# Patient Record
Sex: MALE | Race: WHITE | NOT HISPANIC OR LATINO | Employment: FULL TIME | URBAN - METROPOLITAN AREA
[De-identification: names, ages, dates, MRNs, and addresses within clinical notes are randomized per-mention and may not be internally consistent; named-entity substitution may affect disease eponyms.]

---

## 2021-03-25 ENCOUNTER — TELEPHONE (OUTPATIENT)
Dept: MEDICAL GROUP | Age: 56
End: 2021-03-25

## 2021-03-25 NOTE — TELEPHONE ENCOUNTER
Phone Number Called: [phone] (home) [phone] (work)      Call outcome: Did not leave a detailed message. Requested patient to call back.    Message: unable to leave message. number is no longer in service.

## 2021-03-26 ENCOUNTER — OFFICE VISIT (OUTPATIENT)
Dept: MEDICAL GROUP | Age: 56
End: 2021-03-26
Payer: COMMERCIAL

## 2021-03-26 VITALS
SYSTOLIC BLOOD PRESSURE: 120 MMHG | HEART RATE: 77 BPM | TEMPERATURE: 96 F | DIASTOLIC BLOOD PRESSURE: 74 MMHG | BODY MASS INDEX: 25.21 KG/M2 | HEIGHT: 74 IN | OXYGEN SATURATION: 95 % | WEIGHT: 196.4 LBS

## 2021-03-26 DIAGNOSIS — Z11.59 NEED FOR HEPATITIS C SCREENING TEST: ICD-10-CM

## 2021-03-26 DIAGNOSIS — Z76.89 ESTABLISHING CARE WITH NEW DOCTOR, ENCOUNTER FOR: ICD-10-CM

## 2021-03-26 DIAGNOSIS — Z12.12 SCREENING FOR COLORECTAL CANCER: ICD-10-CM

## 2021-03-26 DIAGNOSIS — Z23 NEED FOR VACCINATION: ICD-10-CM

## 2021-03-26 DIAGNOSIS — Z00.00 WELL ADULT EXAM: ICD-10-CM

## 2021-03-26 DIAGNOSIS — Z12.11 SCREENING FOR COLORECTAL CANCER: ICD-10-CM

## 2021-03-26 DIAGNOSIS — Z12.5 SCREENING FOR PROSTATE CANCER: ICD-10-CM

## 2021-03-26 PROCEDURE — 90471 IMMUNIZATION ADMIN: CPT | Performed by: PHYSICIAN ASSISTANT

## 2021-03-26 PROCEDURE — 90472 IMMUNIZATION ADMIN EACH ADD: CPT | Performed by: PHYSICIAN ASSISTANT

## 2021-03-26 PROCEDURE — 90715 TDAP VACCINE 7 YRS/> IM: CPT | Performed by: PHYSICIAN ASSISTANT

## 2021-03-26 PROCEDURE — 90750 HZV VACC RECOMBINANT IM: CPT | Performed by: PHYSICIAN ASSISTANT

## 2021-03-26 PROCEDURE — 99386 PREV VISIT NEW AGE 40-64: CPT | Mod: 25 | Performed by: PHYSICIAN ASSISTANT

## 2021-03-26 ASSESSMENT — PATIENT HEALTH QUESTIONNAIRE - PHQ9: CLINICAL INTERPRETATION OF PHQ2 SCORE: 0

## 2021-03-26 NOTE — PROGRESS NOTES
Subjective:     CC:   Chief Complaint   Patient presents with   • Establish Care   • Annual Exam       HPI:   Yasir Garcia Jr. is a 55 y.o. male who presents to [facility] and is requesting annual exam.  It has been over 10 years since he has been seen by primary care provider.  He works as a .  He has no significant past medical history.  He is overdue for colonoscopy.    Last Colorectal Cancer Screening: Due  Last Tdap: Due    Exercise: Stays active at work.  Walks/hikes in the summertime.  No specific exercise routine  Diet: Fairly well-rounded.    He  has no past medical history on file.  He  has no past surgical history on file.    Family History   Problem Relation Age of Onset   • No Known Problems Mother    • No Known Problems Father    • No Known Problems Brother      Social History     Tobacco Use   • Smoking status: Never Smoker   • Smokeless tobacco: Never Used   Substance Use Topics   • Alcohol use: Yes     Comment: once every 3 months   • Drug use: Never     He  has no history on file for sexual activity.    There are no problems to display for this patient.    No current outpatient medications on file.     No current facility-administered medications for this visit.     No Known Allergies    Review of Systems   Constitutional: Negative for fever, chills and malaise/fatigue.   HENT: Negative for congestion.    Eyes: Negative for pain.   Respiratory: Negative for cough and shortness of breath.    Cardiovascular: Negative for chest pain and leg swelling.   Gastrointestinal: Negative for nausea, vomiting, abdominal pain and diarrhea.   Genitourinary: Negative for dysuria and hematuria.   Skin: Negative for rash.   Neurological: Negative for dizziness, focal weakness and headaches.   Endo/Heme/Allergies: Does not bruise/bleed easily.   Psychiatric/Behavioral: Negative for depression.  The patient is not nervous/anxious.      Objective:   /74 (BP Location: Left arm,  "Patient Position: Sitting, BP Cuff Size: Adult)   Pulse 77   Temp (!) 35.6 °C (96 °F) (Temporal)   Ht 1.867 m (6' 1.5\")   Wt 89.1 kg (196 lb 6.4 oz)   SpO2 95%   BMI 25.56 kg/m²      Wt Readings from Last 4 Encounters:   03/26/21 89.1 kg (196 lb 6.4 oz)         Physical Exam:  Constitutional: Well-developed and well-nourished. Not diaphoretic. No distress.   Skin: Skin is warm and dry. No rash noted.  Head: Atraumatic without lesions.  Eyes: Conjunctivae and extraocular motions are normal. Pupils are equal, round, and reactive to light. No scleral icterus.   Ears:  External ears unremarkable. Tympanic membranes clear and intact.  Nose: Nares patent. Septum midline. Turbinates without erythema nor edema. No discharge.   Mouth/Throat: Tongue normal. Oropharynx is clear and moist. Posterior pharynx without erythema or exudates.  Neck: Supple, trachea midline. Normal range of motion. No thyromegaly present. No lymphadenopathy--cervical or supraclavicular.  Cardiovascular: Regular rate and rhythm, S1 and S2 without murmur, rubs, or gallops.    Respiratory: Effort normal. Clear to auscultation throughout. No adventitious sounds.   Abdomen: Soft, non tender, and without distention. Active bowel sounds in all four quadrants. No rebound, guarding, masses or HSM.  Extremities: No cyanosis, clubbing, erythema, nor edema. Distal pulses intact and symmetric.   Musculoskeletal: All major joints AROM full in all directions without pain.  Neurological: Alert and oriented x 3. Grossly non-focal. Strength and sensation grossly intact. DTRs 2+/3 and symmetric.   Psychiatric:  Behavior, mood, and affect are appropriate.      Assessment and Plan:     1. Well adult exam  -Advised to increase physical activity as tolerated.  Will check below labs.  Further treatment if needed pending results  - CBC WITH DIFFERENTIAL; Future  - Comp Metabolic Panel; Future  - Lipid Profile; Future  - TSH WITH REFLEX TO FT4; Future    2. Need for " vaccination  -Immunizations given in clinic today  - Tdap Vaccine =>8YO IM  - Shingrix Vaccine    3. Screening for prostate cancer  - PROSTATE SPECIFIC AG SCREENING; Future    4. Establishing care with new doctor, encounter for    5. Need for hepatitis C screening test  - HCV Scrn ( [id number] 1xLife); Future    6. Screening for colorectal cancer  -Due for colonoscopy.  Referral placed  - REFERRAL TO GI FOR COLONOSCOPY      Health maintenance:     Labs per orders  Immunizations per orders  Patient counseled about skin care, diet, supplements, and exercise.  Discussed diet and exercise and prostate cancer screening     Follow-up: Return in about 1 year (around 3/26/2022) for Annual PX.

## 2021-04-30 ENCOUNTER — HOSPITAL ENCOUNTER (OUTPATIENT)
Dept: LAB | Facility: MEDICAL CENTER | Age: 56
End: 2021-04-30
Attending: PHYSICIAN ASSISTANT
Payer: COMMERCIAL

## 2021-04-30 DIAGNOSIS — Z00.00 WELL ADULT EXAM: ICD-10-CM

## 2021-04-30 DIAGNOSIS — Z12.5 SCREENING FOR PROSTATE CANCER: ICD-10-CM

## 2021-04-30 DIAGNOSIS — Z11.59 NEED FOR HEPATITIS C SCREENING TEST: ICD-10-CM

## 2021-04-30 LAB
ALBUMIN SERPL BCP-MCNC: 4.2 G/DL (ref 3.2–4.9)
ALBUMIN/GLOB SERPL: 1.2 G/DL
ALP SERPL-CCNC: 71 U/L (ref 30–99)
ALT SERPL-CCNC: 15 U/L (ref 2–50)
ANION GAP SERPL CALC-SCNC: 6 MMOL/L (ref 7–16)
AST SERPL-CCNC: 18 U/L (ref 12–45)
BASOPHILS # BLD AUTO: 0.5 % (ref 0–1.8)
BASOPHILS # BLD: 0.03 K/UL (ref 0–0.12)
BILIRUB SERPL-MCNC: 1.8 MG/DL (ref 0.1–1.5)
BUN SERPL-MCNC: 19 MG/DL (ref 8–22)
CALCIUM SERPL-MCNC: 9.3 MG/DL (ref 8.5–10.5)
CHLORIDE SERPL-SCNC: 102 MMOL/L (ref 96–112)
CHOLEST SERPL-MCNC: 229 MG/DL (ref 100–199)
CO2 SERPL-SCNC: 27 MMOL/L (ref 20–33)
CREAT SERPL-MCNC: 1.06 MG/DL (ref 0.5–1.4)
EOSINOPHIL # BLD AUTO: 0.1 K/UL (ref 0–0.51)
EOSINOPHIL NFR BLD: 1.6 % (ref 0–6.9)
ERYTHROCYTE [DISTWIDTH] IN BLOOD BY AUTOMATED COUNT: 46.5 FL (ref 35.9–50)
FASTING STATUS PATIENT QL REPORTED: NORMAL
GLOBULIN SER CALC-MCNC: 3.6 G/DL (ref 1.9–3.5)
GLUCOSE SERPL-MCNC: 78 MG/DL (ref 65–99)
HCT VFR BLD AUTO: 49 % (ref 42–52)
HCV AB SER QL: NORMAL
HDLC SERPL-MCNC: 57 MG/DL
HGB BLD-MCNC: 17.2 G/DL (ref 14–18)
IMM GRANULOCYTES # BLD AUTO: 0.02 K/UL (ref 0–0.11)
IMM GRANULOCYTES NFR BLD AUTO: 0.3 % (ref 0–0.9)
LDLC SERPL CALC-MCNC: 146 MG/DL
LYMPHOCYTES # BLD AUTO: 1.58 K/UL (ref 1–4.8)
LYMPHOCYTES NFR BLD: 25.2 % (ref 22–41)
MCH RBC QN AUTO: 34.1 PG (ref 27–33)
MCHC RBC AUTO-ENTMCNC: 35.1 G/DL (ref 33.7–35.3)
MCV RBC AUTO: 97.2 FL (ref 81.4–97.8)
MONOCYTES # BLD AUTO: 0.53 K/UL (ref 0–0.85)
MONOCYTES NFR BLD AUTO: 8.5 % (ref 0–13.4)
NEUTROPHILS # BLD AUTO: 4 K/UL (ref 1.82–7.42)
NEUTROPHILS NFR BLD: 63.9 % (ref 44–72)
NRBC # BLD AUTO: 0 K/UL
NRBC BLD-RTO: 0 /100 WBC
PLATELET # BLD AUTO: 283 K/UL (ref 164–446)
PMV BLD AUTO: 10.8 FL (ref 9–12.9)
POTASSIUM SERPL-SCNC: 4.2 MMOL/L (ref 3.6–5.5)
PROT SERPL-MCNC: 7.8 G/DL (ref 6–8.2)
PSA SERPL-MCNC: 6.27 NG/ML (ref 0–4)
RBC # BLD AUTO: 5.04 M/UL (ref 4.7–6.1)
SODIUM SERPL-SCNC: 135 MMOL/L (ref 135–145)
TRIGL SERPL-MCNC: 128 MG/DL (ref 0–149)
TSH SERPL DL<=0.005 MIU/L-ACNC: 1.37 UIU/ML (ref 0.38–5.33)
WBC # BLD AUTO: 6.3 K/UL (ref 4.8–10.8)

## 2021-04-30 PROCEDURE — 84443 ASSAY THYROID STIM HORMONE: CPT

## 2021-04-30 PROCEDURE — 80053 COMPREHEN METABOLIC PANEL: CPT

## 2021-04-30 PROCEDURE — 85025 COMPLETE CBC W/AUTO DIFF WBC: CPT

## 2021-04-30 PROCEDURE — 36415 COLL VENOUS BLD VENIPUNCTURE: CPT

## 2021-04-30 PROCEDURE — 80061 LIPID PANEL: CPT

## 2021-04-30 PROCEDURE — 84153 ASSAY OF PSA TOTAL: CPT

## 2021-04-30 PROCEDURE — G0472 HEP C SCREEN HIGH RISK/OTHER: HCPCS

## 2021-05-12 ENCOUNTER — TELEPHONE (OUTPATIENT)
Dept: MEDICAL GROUP | Age: 56
End: 2021-05-12

## 2021-05-12 NOTE — TELEPHONE ENCOUNTER
ESTABLISHED PATIENT PRE-VISIT PLANNING     Patient was NOT contacted to complete PVP.     Note: Patient will not be contacted if there is no indication to call.     1.  Reviewed notes from the last few office visits within the medical group: Yes    2.  If any orders were placed at last visit or intended to be done for this visit (i.e. 6 mos follow-up), do we have Results/Consult Notes?         •  Labs - Labs ordered, completed on 4/30/2021 and results are in chart.  Note: If patient appointment is for lab review and patient did not complete labs, check with provider if OK to reschedule patient until labs completed.       •  Imaging - Imaging was not ordered at last office visit.       •  Referrals - Referral ordered, patient has NOT been seen.    3. Is this appointment scheduled as a Hospital Follow-Up? No    4.  Immunizations were updated in Epic using Reconcile Outside Information activity? Yes    5.  Patient is due for the following Health Maintenance Topics:   Health Maintenance Due   Topic Date Due   • COVID-19 Vaccine (1) Never done   • COLONOSCOPY  Never done   • IMM ZOSTER VACCINES (2 of 2) 05/21/2021       - Patient plans to schedule appointment for Colonoscopy/FIT.    6.  AHA (Pulse8) form printed for Provider? N/A

## 2021-05-13 ENCOUNTER — TELEMEDICINE (OUTPATIENT)
Dept: MEDICAL GROUP | Age: 56
End: 2021-05-13
Payer: COMMERCIAL

## 2021-05-13 VITALS — BODY MASS INDEX: 26.51 KG/M2 | WEIGHT: 200 LBS | HEIGHT: 73 IN

## 2021-05-13 DIAGNOSIS — R97.20 ELEVATED PSA, LESS THAN 10 NG/ML: ICD-10-CM

## 2021-05-13 DIAGNOSIS — E78.00 PURE HYPERCHOLESTEROLEMIA: ICD-10-CM

## 2021-05-13 PROCEDURE — 99213 OFFICE O/P EST LOW 20 MIN: CPT | Mod: 95 | Performed by: PHYSICIAN ASSISTANT

## 2021-05-13 ASSESSMENT — FIBROSIS 4 INDEX: FIB4 SCORE: 0.9

## 2021-05-13 NOTE — PROGRESS NOTES
Virtual Visit: Established Patient   This visit was conducted via GRAVIDI using secure and encrypted videoconferencing technology. The patient was in a private location in the [facility].    The patient's identity was confirmed and verbal consent was obtained for this virtual visit.    Subjective:   CC:   Chief Complaint   Patient presents with   • Lab Results       Karena Ambriz Jr. is a 55 y.o. male presenting for lab review.  His cholesterol level is mildly elevated.  He walks and hikes intermittently for exercise.  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  He states he does have room for improvement in his diet, eats red meat intermittently.  PSA is also mildly elevated.  Do not have previous lab values.  He does note intermittent weak urine stream, but this is not consistent.  Denies nocturia, urinary frequency/urgency, dysuria, trouble initiating stream.    Results for KARENA AMBRIZ JR. (MRN [id number]) as of 5/13/2021 07:39   Ref. Range 4/30/2021 10:22   WBC Latest Ref Range: 4.8 - 10.8 K/uL 6.3   RBC Latest Ref Range: 4.70 - 6.10 M/uL 5.04   Hemoglobin Latest Ref Range: 14.0 - 18.0 g/dL 17.2   Hematocrit Latest Ref Range: 42.0 - 52.0 % 49.0   MCV Latest Ref Range: 81.4 - 97.8 fL 97.2   MCH Latest Ref Range: 27.0 - 33.0 pg 34.1 (H)   MCHC Latest Ref Range: 33.7 - 35.3 g/dL 35.1   RDW Latest Ref Range: 35.9 - 50.0 fL 46.5   Platelet Count Latest Ref Range: 164 - 446 K/uL 283   MPV Latest Ref Range: 9.0 - 12.9 fL 10.8   Neutrophils-Polys Latest Ref Range: 44.00 - 72.00 % 63.90   Neutrophils (Absolute) Latest Ref Range: 1.82 - 7.42 K/uL 4.00   Lymphocytes Latest Ref Range: 22.00 - 41.00 % 25.20   Lymphs (Absolute) Latest Ref Range: 1.00 - 4.80 K/uL 1.58   Monocytes Latest Ref Range: 0.00 - 13.40 % 8.50   Monos (Absolute) Latest Ref Range: 0.00 - 0.85 K/uL 0.53   Eosinophils Latest Ref Range: 0.00 - 6.90 % 1.60   Eos (Absolute) Latest Ref Range: 0.00 - 0.51 K/uL 0.10   Basophils  Latest Ref Range: 0.00 - 1.80 % 0.50   Baso (Absolute) Latest Ref Range: 0.00 - 0.12 K/uL 0.03   Immature Granulocytes Latest Ref Range: 0.00 - 0.90 % 0.30   Immature Granulocytes (abs) Latest Ref Range: 0.00 - 0.11 K/uL 0.02   Nucleated RBC Latest Units: /100 WBC 0.00   NRBC (Absolute) Latest Units: K/uL 0.00   Sodium Latest Ref Range: 135 - 145 mmol/L 135   Potassium Latest Ref Range: 3.6 - 5.5 mmol/L 4.2   Chloride Latest Ref Range: 96 - 112 mmol/L 102   Co2 Latest Ref Range: 20 - 33 mmol/L 27   Anion Gap Latest Ref Range: 7.0 - 16.0  6.0 (L)   Glucose Latest Ref Range: 65 - 99 mg/dL 78   Bun Latest Ref Range: 8 - 22 mg/dL 19   Creatinine Latest Ref Range: 0.50 - 1.40 mg/dL 1.06   GFR If  Latest Ref Range: >60 mL/min/1.73 m 2 >60   GFR If Non  Latest Ref Range: >60 mL/min/1.73 m 2 >60   Calcium Latest Ref Range: 8.5 - 10.5 mg/dL 9.3   AST(SGOT) Latest Ref Range: 12 - 45 U/L 18   ALT(SGPT) Latest Ref Range: 2 - 50 U/L 15   Alkaline Phosphatase Latest Ref Range: 30 - 99 U/L 71   Total Bilirubin Latest Ref Range: 0.1 - 1.5 mg/dL 1.8 (H)   Albumin Latest Ref Range: 3.2 - 4.9 g/dL 4.2   Total Protein Latest Ref Range: 6.0 - 8.2 g/dL 7.8   Globulin Latest Ref Range: 1.9 - 3.5 g/dL 3.6 (H)   A-G Ratio Latest Units: g/dL 1.2   Fasting Status Unknown Fasting   Cholesterol,Tot Latest Ref Range: 100 - 199 mg/dL 229 (H)   Triglycerides Latest Ref Range: 0 - 149 mg/dL 128   HDL Latest Ref Range: >=40 mg/dL 57   LDL Latest Ref Range: <100 mg/dL 146 (H)   Prostatic Specific Antigen Tot Latest Ref Range: 0.00 - 4.00 ng/mL 6.27 (H)   TSH Latest Ref Range: 0.380 - 5.330 uIU/mL 1.370   Hepatitis C Antibody Latest Ref Range: Non-Reactive  Non-Reactive     ROS   Denies any recent fevers or chills. No nausea or vomiting. No chest pains or shortness of breath.     No Known Allergies    Current medicines (including changes today)  No current outpatient medications on file.     No current  "facility-administered medications for this visit.       There are no problems to display for this patient.      Family History   Problem Relation Age of Onset   • No Known Problems Mother    • No Known Problems Father    • No Known Problems Brother        He  has no past medical history on file.  He  has no past surgical history on file.       Objective:   Ht 1.854 m (6' 1\") Comment: Pt. stated  Wt 90.7 kg (200 lb) Comment: Pt. stated  BMI 26.39 kg/m²     Physical Exam:  Constitutional: Alert, no distress, well-groomed.  Skin: No rashes in visible areas.  Eye: Round. Conjunctiva clear, lids normal. No icterus.   ENMT: Lips pink without lesions, good dentition, moist mucous membranes. Phonation normal.  Neck: No masses, no thyromegaly. Moves freely without pain.  Respiratory: Unlabored respiratory effort, no cough or audible wheeze  Psych: Alert and oriented x3, normal affect and mood.       Assessment and Plan:   The following treatment plan was discussed:     1. Elevated PSA, less than 10 ng/ml  -PSA level is slightly elevated.  Will place referral to urology for further evaluation.  - REFERRAL TO UROLOGY    2. Pure hypercholesterolemia  -Cholesterol levels mildly elevated.  The 10-year CVD risk score (D'Agostino, et al., 2008) is: 9.8%.  We will try to gain better control through lifestyle modifications.  Advised to increase physical activity as tolerated and reviewed diet control.  We will repeat lipid panel again in 6 months.  If still elevated will consider starting statin medication  - Lipid Profile; Future         Follow-up: Return in about 1 year (around 5/13/2022) for Annual PX.         "

## 2024-02-26 ENCOUNTER — OFFICE VISIT (OUTPATIENT)
Dept: MEDICAL GROUP | Facility: MEDICAL CENTER | Age: 59
End: 2024-02-26
Payer: COMMERCIAL

## 2024-02-26 VITALS
HEART RATE: 86 BPM | DIASTOLIC BLOOD PRESSURE: 66 MMHG | WEIGHT: 209.6 LBS | BODY MASS INDEX: 26.9 KG/M2 | OXYGEN SATURATION: 97 % | SYSTOLIC BLOOD PRESSURE: 126 MMHG | HEIGHT: 74 IN | RESPIRATION RATE: 16 BRPM | TEMPERATURE: 98 F

## 2024-02-26 DIAGNOSIS — R06.83 SNORING: ICD-10-CM

## 2024-02-26 DIAGNOSIS — E78.2 MIXED HYPERLIPIDEMIA: ICD-10-CM

## 2024-02-26 DIAGNOSIS — Z00.00 ENCOUNTER FOR MEDICAL EXAMINATION TO ESTABLISH CARE: ICD-10-CM

## 2024-02-26 DIAGNOSIS — Z11.4 ENCOUNTER FOR SCREENING FOR HIV: ICD-10-CM

## 2024-02-26 DIAGNOSIS — R97.20 ELEVATED PSA: ICD-10-CM

## 2024-02-26 PROCEDURE — 99214 OFFICE O/P EST MOD 30 MIN: CPT | Performed by: STUDENT IN AN ORGANIZED HEALTH CARE EDUCATION/TRAINING PROGRAM

## 2024-02-26 PROCEDURE — 3074F SYST BP LT 130 MM HG: CPT | Performed by: STUDENT IN AN ORGANIZED HEALTH CARE EDUCATION/TRAINING PROGRAM

## 2024-02-26 PROCEDURE — 3078F DIAST BP <80 MM HG: CPT | Performed by: STUDENT IN AN ORGANIZED HEALTH CARE EDUCATION/TRAINING PROGRAM

## 2024-02-26 ASSESSMENT — PATIENT HEALTH QUESTIONNAIRE - PHQ9: CLINICAL INTERPRETATION OF PHQ2 SCORE: 0

## 2024-02-26 NOTE — PROGRESS NOTES
Subjective:     CC:  Diagnoses of Encounter for medical examination to establish care, Mixed hyperlipidemia, Elevated PSA, Snoring, and Encounter for screening for HIV were pertinent to this visit.    HISTORY OF THE PRESENT ILLNESS: Patient is a 58 y.o. male. This pleasant patient is here today to establish care and discuss the followng    Problem   Mixed Hyperlipidemia    Chronic condition, no recent labs    Lab Results   Component Value Date/Time    CHOLSTRLTOT 229 (H) 04/30/2021 10:22 AM     (H) 04/30/2021 10:22 AM    HDL 57 04/30/2021 10:22 AM    TRIGLYCERIDE 128 04/30/2021 10:22 AM       Lab Results   Component Value Date/Time    SODIUM 135 04/30/2021 10:22 AM    POTASSIUM 4.2 04/30/2021 10:22 AM    CHLORIDE 102 04/30/2021 10:22 AM    CO2 27 04/30/2021 10:22 AM    GLUCOSE 78 04/30/2021 10:22 AM    BUN 19 04/30/2021 10:22 AM    CREATININE 1.06 04/30/2021 10:22 AM     Lab Results   Component Value Date/Time    ALKPHOSPHAT 71 04/30/2021 10:22 AM    ASTSGOT 18 04/30/2021 10:22 AM    ALTSGPT 15 04/30/2021 10:22 AM    TBILIRUBIN 1.8 (H) 04/30/2021 10:22 AM           Elevated Psa    This is a chronic condition.  Elevated on screening test, saw urology who did some testing and stated that this was fine     Snoring    This is a chronic condition.  He snores loudly and has had witnessed apneic episodes from his spouse.    STOPBANG - Sleep Apnea Screening      Flowsheet Row Most Recent Value   S - Have you been told that you SNORE? Yes   T - Are you often TIRED during the day? Yes   O - Do you know if you stop breathing or has anyone witnessed you stop breathing while you were asleep? (OBSTRUCTION) Yes   P - Do you have high blood PRESSURE or on medication to control high blood pressure? No   B - Is your Body Mass Index greater than 35? (BMI) No   A - Are you 50 years old or older? (AGE) Yes   N - Are you a male with a NECK circumference greater than 17 inches, or a female with a neck circumference greater than 16  "inches? No   G - Are you male? (GENDER) Yes   STOPBANG Total Score 5   MURPHY Risk High Risk               ROS:   ROS      Objective:     Exam: /66 (BP Location: Left arm, Patient Position: Sitting, BP Cuff Size: Adult)   Pulse 86   Temp 36.7 °C (98 °F) (Temporal)   Resp 16   Ht 1.88 m (6' 2\")   Wt 95.1 kg (209 lb 9.6 oz)   SpO2 97%  Body mass index is 26.91 kg/m².    Physical Exam  Vitals reviewed.   Constitutional:       General: He is not in acute distress.     Appearance: He is not toxic-appearing.   HENT:      Head: Normocephalic and atraumatic.      Right Ear: Tympanic membrane, ear canal and external ear normal.      Left Ear: Tympanic membrane, ear canal and external ear normal.      Nose: Nose normal.      Mouth/Throat:      Mouth: Mucous membranes are moist.      Pharynx: Oropharynx is clear. No oropharyngeal exudate or posterior oropharyngeal erythema.   Eyes:      General:         Right eye: No discharge.         Left eye: No discharge.      Extraocular Movements: Extraocular movements intact.      Conjunctiva/sclera: Conjunctivae normal.   Cardiovascular:      Rate and Rhythm: Normal rate and regular rhythm.      Pulses: Normal pulses.      Heart sounds: Normal heart sounds. No murmur heard.  Pulmonary:      Effort: Pulmonary effort is normal. No respiratory distress.      Breath sounds: Normal breath sounds.   Musculoskeletal:      Cervical back: Normal range of motion and neck supple.   Lymphadenopathy:      Cervical: No cervical adenopathy.   Skin:     General: Skin is warm and dry.   Neurological:      Mental Status: He is alert.   Psychiatric:         Mood and Affect: Mood normal.         Behavior: Behavior normal.         Thought Content: Thought content normal.         Judgment: Judgment normal.           Assessment & Plan:   58 y.o. male with the following -    1. Encounter for medical examination to establish care  History, problem list, medications and allergies reviewed.  Records " requested from previous provider if applicable.    2. Mixed hyperlipidemia  Recheck labs  - Lipid Profile; Future  - Comp Metabolic Panel; Future    3. Elevated PSA  Recheck labs  - PROSTATE SPECIFIC AG SCREENING; Future    4. Snoring  Discussed doing a home sleep study  - Overnight Home Sleep Study; Future    5. Encounter for screening for HIV  - HIV AG/AB COMBO ASSAY SCREENING; Future      No follow-ups on file.    Please note that this dictation was created using voice recognition software. I have made every reasonable attempt to correct obvious errors, but I expect that there are errors of grammar and possibly content that I did not discover before finalizing the note.